# Patient Record
Sex: MALE | Race: WHITE
[De-identification: names, ages, dates, MRNs, and addresses within clinical notes are randomized per-mention and may not be internally consistent; named-entity substitution may affect disease eponyms.]

---

## 2018-08-08 ENCOUNTER — HOSPITAL ENCOUNTER (OUTPATIENT)
Dept: HOSPITAL 92 - SCSRAD | Age: 38
Discharge: HOME | End: 2018-08-08
Attending: FAMILY MEDICINE
Payer: COMMERCIAL

## 2018-08-08 DIAGNOSIS — M48.8X6: ICD-10-CM

## 2018-08-08 DIAGNOSIS — M54.5: Primary | ICD-10-CM

## 2018-08-08 DIAGNOSIS — M48.8X7: ICD-10-CM

## 2018-08-08 PROCEDURE — 72100 X-RAY EXAM L-S SPINE 2/3 VWS: CPT

## 2018-08-08 NOTE — RAD
LUMBAR SPINE 3 VIEWS:

 

Date:  08/08/18 

 

HISTORY:  

Back pain. 

 

FINDINGS:

Slight curvature to the left in the AP projection with apex at L2-3. On the lateral view, the lumbar 
vertebra maintain height and alignment. There is loss of disc space at L4-5 and at L5-S1. Mild facet 
hypertrophy throughout the lumbar spine, most prominent at L4-5 and L5-S1. No evidence of spondylolis
thesis. 

 

IMPRESSION: 

Loss of disc space at L4-5 and L5-S1, and mild facet hypertrophy as described. 

 

 

POS: [facility]